# Patient Record
Sex: FEMALE | Race: WHITE | Employment: FULL TIME | URBAN - NONMETROPOLITAN AREA
[De-identification: names, ages, dates, MRNs, and addresses within clinical notes are randomized per-mention and may not be internally consistent; named-entity substitution may affect disease eponyms.]

---

## 2017-08-25 ENCOUNTER — APPOINTMENT (OUTPATIENT)
Dept: GENERAL RADIOLOGY | Age: 32
End: 2017-08-25
Payer: COMMERCIAL

## 2017-08-25 ENCOUNTER — HOSPITAL ENCOUNTER (EMERGENCY)
Age: 32
Discharge: HOME | End: 2017-08-25
Payer: COMMERCIAL

## 2017-08-25 VITALS
TEMPERATURE: 98.4 F | BODY MASS INDEX: 19.29 KG/M2 | OXYGEN SATURATION: 100 % | DIASTOLIC BLOOD PRESSURE: 96 MMHG | SYSTOLIC BLOOD PRESSURE: 142 MMHG | HEIGHT: 66 IN | HEART RATE: 75 BPM | WEIGHT: 120 LBS | RESPIRATION RATE: 14 BRPM

## 2017-08-25 DIAGNOSIS — S69.90XA INJURY, OTHER AND UNSPECIFIED, FINGER: Primary | ICD-10-CM

## 2017-08-25 PROCEDURE — 99283 EMERGENCY DEPT VISIT LOW MDM: CPT

## 2017-08-25 PROCEDURE — 73130 X-RAY EXAM OF HAND: CPT

## 2017-08-25 PROCEDURE — 73120 X-RAY EXAM OF HAND: CPT

## 2017-08-25 ASSESSMENT — ENCOUNTER SYMPTOMS
COLOR CHANGE: 0
BACK PAIN: 0
NAUSEA: 0

## 2017-08-25 ASSESSMENT — PAIN DESCRIPTION - DESCRIPTORS: DESCRIPTORS: PATIENT UNABLE TO DESCRIBE

## 2017-08-25 ASSESSMENT — PAIN DESCRIPTION - LOCATION: LOCATION: HAND

## 2017-08-25 ASSESSMENT — PAIN DESCRIPTION - ORIENTATION: ORIENTATION: LEFT

## 2017-08-25 ASSESSMENT — PAIN DESCRIPTION - PAIN TYPE: TYPE: ACUTE PAIN

## 2017-08-25 ASSESSMENT — PAIN SCALES - GENERAL: PAINLEVEL_OUTOF10: 7

## 2017-08-25 ASSESSMENT — PAIN - FUNCTIONAL ASSESSMENT: PAIN_FUNCTIONAL_ASSESSMENT: 0-10

## 2020-08-10 ENCOUNTER — APPOINTMENT (OUTPATIENT)
Dept: GENERAL RADIOLOGY | Age: 35
End: 2020-08-10
Payer: COMMERCIAL

## 2020-08-10 ENCOUNTER — HOSPITAL ENCOUNTER (EMERGENCY)
Age: 35
Discharge: HOME | End: 2020-08-10
Attending: EMERGENCY MEDICINE
Payer: COMMERCIAL

## 2020-08-10 ENCOUNTER — APPOINTMENT (OUTPATIENT)
Dept: CT IMAGING | Age: 35
End: 2020-08-10
Payer: COMMERCIAL

## 2020-08-10 VITALS
OXYGEN SATURATION: 100 % | DIASTOLIC BLOOD PRESSURE: 89 MMHG | HEIGHT: 66 IN | TEMPERATURE: 98 F | SYSTOLIC BLOOD PRESSURE: 108 MMHG | RESPIRATION RATE: 16 BRPM | HEART RATE: 67 BPM | BODY MASS INDEX: 20.89 KG/M2 | WEIGHT: 130 LBS

## 2020-08-10 LAB
ANION GAP SERPL CALCULATED.3IONS-SCNC: 8 MEQ/L (ref 8–16)
BASOPHILS # BLD: 0.9 %
BASOPHILS ABSOLUTE: 0.1 THOU/MM3 (ref 0–0.1)
BUN BLDV-MCNC: 8 MG/DL (ref 7–22)
CALCIUM SERPL-MCNC: 8.7 MG/DL (ref 8.5–10.5)
CHLORIDE BLD-SCNC: 108 MEQ/L (ref 98–111)
CO2: 24 MEQ/L (ref 23–33)
CREAT SERPL-MCNC: 0.7 MG/DL (ref 0.4–1.2)
EKG ATRIAL RATE: 63 BPM
EKG P AXIS: 67 DEGREES
EKG P-R INTERVAL: 160 MS
EKG Q-T INTERVAL: 396 MS
EKG QRS DURATION: 80 MS
EKG QTC CALCULATION (BAZETT): 405 MS
EKG R AXIS: 55 DEGREES
EKG T AXIS: 39 DEGREES
EKG VENTRICULAR RATE: 63 BPM
EOSINOPHIL # BLD: 0.9 %
EOSINOPHILS ABSOLUTE: 0.1 THOU/MM3 (ref 0–0.4)
ERYTHROCYTE [DISTWIDTH] IN BLOOD BY AUTOMATED COUNT: 12.3 % (ref 11.5–14.5)
ERYTHROCYTE [DISTWIDTH] IN BLOOD BY AUTOMATED COUNT: 40.3 FL (ref 35–45)
GFR SERPL CREATININE-BSD FRML MDRD: > 90 ML/MIN/1.73M2
GLUCOSE BLD-MCNC: 92 MG/DL (ref 70–108)
HCT VFR BLD CALC: 40.9 % (ref 37–47)
HEMOGLOBIN: 13.4 GM/DL (ref 12–16)
IMMATURE GRANS (ABS): 0.02 THOU/MM3 (ref 0–0.07)
IMMATURE GRANULOCYTES: 0.3 %
LYMPHOCYTES # BLD: 17 %
LYMPHOCYTES ABSOLUTE: 1 THOU/MM3 (ref 1–4.8)
MCH RBC QN AUTO: 29.4 PG (ref 26–33)
MCHC RBC AUTO-ENTMCNC: 32.8 GM/DL (ref 32.2–35.5)
MCV RBC AUTO: 89.7 FL (ref 81–99)
MONOCYTES # BLD: 6.8 %
MONOCYTES ABSOLUTE: 0.4 THOU/MM3 (ref 0.4–1.3)
NUCLEATED RED BLOOD CELLS: 0 /100 WBC
OSMOLALITY CALCULATION: 277.4 MOSMOL/KG (ref 275–300)
PLATELET # BLD: 169 THOU/MM3 (ref 130–400)
PMV BLD AUTO: 11.1 FL (ref 9.4–12.4)
POTASSIUM REFLEX MAGNESIUM: 3.8 MEQ/L (ref 3.5–5.2)
PREGNANCY, SERUM: NEGATIVE
RBC # BLD: 4.56 MILL/MM3 (ref 4.2–5.4)
SEG NEUTROPHILS: 74.1 %
SEGMENTED NEUTROPHILS ABSOLUTE COUNT: 4.3 THOU/MM3 (ref 1.8–7.7)
SODIUM BLD-SCNC: 140 MEQ/L (ref 135–145)
TROPONIN T: < 0.01 NG/ML
WBC # BLD: 5.8 THOU/MM3 (ref 4.8–10.8)

## 2020-08-10 PROCEDURE — 99284 EMERGENCY DEPT VISIT MOD MDM: CPT

## 2020-08-10 PROCEDURE — 93010 ELECTROCARDIOGRAM REPORT: CPT | Performed by: INTERNAL MEDICINE

## 2020-08-10 PROCEDURE — 36415 COLL VENOUS BLD VENIPUNCTURE: CPT

## 2020-08-10 PROCEDURE — 84484 ASSAY OF TROPONIN QUANT: CPT

## 2020-08-10 PROCEDURE — 80048 BASIC METABOLIC PNL TOTAL CA: CPT

## 2020-08-10 PROCEDURE — 84703 CHORIONIC GONADOTROPIN ASSAY: CPT

## 2020-08-10 PROCEDURE — 99283 EMERGENCY DEPT VISIT LOW MDM: CPT

## 2020-08-10 PROCEDURE — 93005 ELECTROCARDIOGRAM TRACING: CPT | Performed by: STUDENT IN AN ORGANIZED HEALTH CARE EDUCATION/TRAINING PROGRAM

## 2020-08-10 PROCEDURE — 85025 COMPLETE CBC W/AUTO DIFF WBC: CPT

## 2020-08-10 PROCEDURE — 70450 CT HEAD/BRAIN W/O DYE: CPT

## 2020-08-10 PROCEDURE — 71045 X-RAY EXAM CHEST 1 VIEW: CPT

## 2020-08-10 ASSESSMENT — ENCOUNTER SYMPTOMS
SHORTNESS OF BREATH: 0
EYE REDNESS: 0
ABDOMINAL PAIN: 0
DIARRHEA: 0
ABDOMINAL DISTENTION: 0
CHEST TIGHTNESS: 0
EYE ITCHING: 0
NAUSEA: 0
SINUS PRESSURE: 0
SINUS PAIN: 0
COLOR CHANGE: 0
RHINORRHEA: 0
EYE DISCHARGE: 0
VOMITING: 0

## 2020-08-10 NOTE — ED NOTES
Pt up to bathroom. Pt states that the numbness and tingling has improved. Pt updated on plan of care.      Bradley Myers RN  08/10/20 3021

## 2020-08-10 NOTE — ED NOTES
Pt presents to the ED for left leg numbness that started around 0400 today. Pt states that for the past 2 days she has felt \"weird\". Pt states that she also feels like she is shaking. Pt states she feels unbalanced. Pt denies SOB, chest pain and cough. Pt denies pain.       Maddison Rowe RN  08/10/20 3940

## 2020-08-10 NOTE — ED PROVIDER NOTES
chest pain. Gastrointestinal: Negative for abdominal distention, abdominal pain, diarrhea, nausea and vomiting. Genitourinary: Negative for difficulty urinating, dysuria, frequency, hematuria and urgency. Musculoskeletal: Negative for arthralgias. Skin: Negative for color change and pallor. Neurological: Negative for dizziness, light-headedness and headaches. PAST MEDICAL AND SURGICAL HISTORY   No past medical history on file. Past Surgical History:   Procedure Laterality Date    APPENDECTOMY      MYRINGOTOMY AND TYMPANOSTOMY TUBE PLACEMENT Bilateral     when she was 4years old   [address]   No current facility-administered medications for this encounter. Current Outpatient Medications:     Prenatal Multivit-Min-Fe-FA (PRENATAL VITAMINS PO), Take by mouth, Disp: , Rfl:     UNKNOWN TO PATIENT, Migraine Medication. , Disp: , Rfl:       SOCIAL HISTORY     Social History     Social History Narrative    Not on file     Social History     Tobacco Use    Smoking status: Never Smoker   Substance Use Topics    Alcohol use: No    Drug use: No         ALLERGIES     Allergies   Allergen Reactions    Pcn [Penicillins]          FAMILY HISTORY   No family history on file. PREVIOUS RECORDS   Previous records reviewed    PHYSICAL EXAM     ED Triage Vitals [08/10/20 0912]   BP Temp Temp src Pulse Resp SpO2 Height Weight   (!) 125/104 -- -- 88 17 100 % 5' 6\" (1.676 m) 130 lb (59 kg)     Initial vital signs and nursing assessment reviewed and normal. Pulsoximetry is normal per my interpretation. Additional Vital Signs:  Vitals:    08/10/20 1108   BP: 108/89   Pulse: 67   Resp: 16   Temp: 98 °F (36.7 °C)   SpO2: 100%       Physical Exam  Vitals signs and nursing note reviewed. Constitutional:       General: She is not in acute distress. Appearance: Normal appearance. She is normal weight. She is not ill-appearing. HENT:      Head: Normocephalic and atraumatic. Nose: Nose normal. No congestion or rhinorrhea. Mouth/Throat:      Mouth: Mucous membranes are moist.      Pharynx: Oropharynx is clear. No oropharyngeal exudate or posterior oropharyngeal erythema. Eyes:      Extraocular Movements: Extraocular movements intact. Pupils: Pupils are equal, round, and reactive to light. Neck:      Musculoskeletal: Normal range of motion and neck supple. Cardiovascular:      Rate and Rhythm: Normal rate and regular rhythm. Pulses: Normal pulses. Heart sounds: Normal heart sounds. Pulmonary:      Effort: Pulmonary effort is normal.      Breath sounds: Normal breath sounds. Abdominal:      General: Abdomen is flat. There is no distension. Palpations: Abdomen is soft. Tenderness: There is no abdominal tenderness. Musculoskeletal: Normal range of motion. General: No swelling, tenderness or signs of injury. Right lower leg: No edema. Left lower leg: No edema. Comments: No calf tenderness. Skin:     General: Skin is warm and dry. Neurological:      General: No focal deficit present. Mental Status: She is alert and oriented to person, place, and time. Mental status is at baseline. Sensory: No sensory deficit. Comments: Pulse, motor, and sensory functions present and intact in the bilateral upper and lower extremities. MEDICAL DECISION MAKING   Initial Assessment: 37 y/o female presenting to the ED for evaluation of chest discomfort and extremity paresthesias. Plan: Labs, cardiac workup, CT head. ED RESULTS   Laboratory results:  Labs Reviewed   CBC WITH AUTO DIFFERENTIAL   BASIC METABOLIC PANEL W/ REFLEX TO MG FOR LOW K   HCG, SERUM, QUALITATIVE   TROPONIN   ANION GAP   GLOMERULAR FILTRATION RATE, ESTIMATED   OSMOLALITY       Radiologic studies results:  CT HEAD WO CONTRAST   Final Result   There is no acute edema or hemorrhage. Ectopic cerebellar tonsils are favored. **This report has been created using voice recognition software. It may contain minor errors which are inherent in voice recognition technology. **      Final report electronically signed by Dr. Bernabe Mejia on 8/10/2020 12:17 PM      XR CHEST PORTABLE   Final Result   No acute abnormality identified. **This report has been created using voice recognition software. It may contain minor errors which are inherent in voice recognition technology. **      Final report electronically signed by Dr. Bessie Leon MD on 8/10/2020 10:42 AM          ED Medications administered this visit: Medications - No data to display      ED COURSE        Strict return precautions and follow up instructions were discussed with the patient prior to discharge, with which the patient agrees. MEDICATION CHANGES     Discharge Medication List as of 8/10/2020 12:39 PM            FINAL DISPOSITION   This is a 35 y/o female with no past medical history presenting to the ED for evaluation of chest discomfort and extremity paresthesias. Cardiac workup was negative for ACS, and she is negative for PE by the [facility] criteria. Throughout the emergency department course, she reported gradual improvement of her symptoms and resolution of her symptoms at the time of discharge. Her CT head, while negative for acute intracranial process, demonstrated an incidental finding of possible ectopic cerebellar tonsils. I discussed test results and treatment plan with patient at bedside. I explained that we were unable to find the specific cause of her symptoms in the Emergency Department today, but that she should follow up with her primary care provider within one week regarding her ED visit today. Patient verbalized understanding of treatment plan, return precautions, and all questions were answered at bedside. At this time, they are stable for discharge from the emergency department.        Final diagnoses:   Numbness and tingling

## 2020-10-09 ENCOUNTER — HOSPITAL ENCOUNTER (OUTPATIENT)
Age: 35
Setting detail: SPECIMEN
Discharge: HOME | End: 2020-10-09
Payer: COMMERCIAL

## 2020-10-09 PROCEDURE — U0003 INFECTIOUS AGENT DETECTION BY NUCLEIC ACID (DNA OR RNA); SEVERE ACUTE RESPIRATORY SYNDROME CORONAVIRUS 2 (SARS-COV-2) (CORONAVIRUS DISEASE [COVID-19]), AMPLIFIED PROBE TECHNIQUE, MAKING USE OF HIGH THROUGHPUT TECHNOLOGIES AS DESCRIBED BY CMS-2020-01-R: HCPCS

## 2020-10-12 LAB
PERFORMING LAB: NORMAL
REPORT: NORMAL
SARS-COV-2: NOT DETECTED

## 2021-08-19 ENCOUNTER — HOSPITAL ENCOUNTER (EMERGENCY)
Age: 36
Discharge: HOME | End: 2021-08-19
Attending: EMERGENCY MEDICINE
Payer: COMMERCIAL

## 2021-08-19 VITALS
OXYGEN SATURATION: 96 % | RESPIRATION RATE: 17 BRPM | BODY MASS INDEX: 22.5 KG/M2 | HEART RATE: 92 BPM | WEIGHT: 140 LBS | SYSTOLIC BLOOD PRESSURE: 129 MMHG | DIASTOLIC BLOOD PRESSURE: 88 MMHG | HEIGHT: 66 IN | TEMPERATURE: 98.8 F

## 2021-08-19 DIAGNOSIS — R51.9 NONINTRACTABLE HEADACHE, UNSPECIFIED CHRONICITY PATTERN, UNSPECIFIED HEADACHE TYPE: Primary | ICD-10-CM

## 2021-08-19 DIAGNOSIS — U07.1 COVID-19: ICD-10-CM

## 2021-08-19 LAB
FLU A ANTIGEN: NEGATIVE
FLU B ANTIGEN: NEGATIVE
SARS-COV-2, NAAT: DETECTED

## 2021-08-19 PROCEDURE — 96372 THER/PROPH/DIAG INJ SC/IM: CPT

## 2021-08-19 PROCEDURE — 99283 EMERGENCY DEPT VISIT LOW MDM: CPT

## 2021-08-19 PROCEDURE — 6370000000 HC RX 637 (ALT 250 FOR IP): Performed by: STUDENT IN AN ORGANIZED HEALTH CARE EDUCATION/TRAINING PROGRAM

## 2021-08-19 PROCEDURE — 87635 SARS-COV-2 COVID-19 AMP PRB: CPT

## 2021-08-19 PROCEDURE — 6360000002 HC RX W HCPCS: Performed by: STUDENT IN AN ORGANIZED HEALTH CARE EDUCATION/TRAINING PROGRAM

## 2021-08-19 PROCEDURE — 87804 INFLUENZA ASSAY W/OPTIC: CPT

## 2021-08-19 RX ORDER — KETOROLAC TROMETHAMINE 30 MG/ML
30 INJECTION, SOLUTION INTRAMUSCULAR; INTRAVENOUS ONCE
Status: COMPLETED | OUTPATIENT
Start: 2021-08-19 | End: 2021-08-19

## 2021-08-19 RX ORDER — METOCLOPRAMIDE 10 MG/1
20 TABLET ORAL ONCE
Status: COMPLETED | OUTPATIENT
Start: 2021-08-19 | End: 2021-08-19

## 2021-08-19 RX ORDER — DIPHENHYDRAMINE HCL 25 MG
50 TABLET ORAL ONCE
Status: COMPLETED | OUTPATIENT
Start: 2021-08-19 | End: 2021-08-19

## 2021-08-19 RX ORDER — ACETAMINOPHEN 500 MG
1000 TABLET ORAL ONCE
Status: COMPLETED | OUTPATIENT
Start: 2021-08-19 | End: 2021-08-19

## 2021-08-19 RX ADMIN — DIPHENHYDRAMINE HCL 50 MG: 25 TABLET ORAL at 14:11

## 2021-08-19 RX ADMIN — METOCLOPRAMIDE 20 MG: 10 TABLET ORAL at 14:11

## 2021-08-19 RX ADMIN — ACETAMINOPHEN 1000 MG: 500 TABLET ORAL at 14:12

## 2021-08-19 RX ADMIN — KETOROLAC TROMETHAMINE 30 MG: 30 INJECTION, SOLUTION INTRAMUSCULAR; INTRAVENOUS at 14:12

## 2021-08-19 ASSESSMENT — PAIN DESCRIPTION - LOCATION: LOCATION: HEAD

## 2021-08-19 ASSESSMENT — PAIN DESCRIPTION - PAIN TYPE: TYPE: ACUTE PAIN

## 2021-08-19 ASSESSMENT — PAIN SCALES - GENERAL
PAINLEVEL_OUTOF10: 7
PAINLEVEL_OUTOF10: 7

## 2021-08-19 NOTE — ED TRIAGE NOTES
Presents to ER with complaints of headache and cough that began yesterday. Pt states she has been around 3 people who have tested positive for COVID. Upon arrival pt has eye mask on and holding bucket against chest. Respirations unlabored.

## 2021-08-19 NOTE — ED PROVIDER NOTES
[address] [address]          Pt Name: Bronson Isabel  MRN: [id number]  [place] 1985  Date of evaluation: 8/19/2021  Treating Resident Physician: Kings Heredia MD  Supervising Physician: Dr. Luke [place]       Chief Complaint   Patient presents with    Covid Testing    Headache     History obtained from the patient     HISTORY OF PRESENT ILLNESS    HPI  Bronson Isabel is a 30 y.o. female who presents to the emergency department for evaluation of, frontal, associated nausea, no vomiting, photophobia, phonophobia. Patient has positive myalgias arthralgias generalized fatigue decreased appetite. Positive Covid contact unvaccinated. Denies any neck pain, neck stiffness. The patient has no other acute complaints at this time. Headache. Patient states that headache is 8 out of 10    REVIEW OF SYSTEMS   Review of Systems   Constitutional: Positive for chills, diaphoresis and fatigue. Negative for fever. HENT: Positive for congestion. Eyes: Positive for photophobia. Negative for visual disturbance. Respiratory: Negative for cough, choking and shortness of breath. Gastrointestinal: Positive for diarrhea and nausea. Negative for abdominal distention. Genitourinary: Negative. Musculoskeletal: Positive for arthralgias and myalgias. Negative for neck pain and neck stiffness. Skin: Negative for rash. Neurological: Positive for headaches. Negative for syncope, weakness and light-headedness. PAST MEDICAL AND SURGICAL HISTORY   No past medical history on file. Past Surgical History:   Procedure Laterality Date    APPENDECTOMY      MYRINGOTOMY AND TYMPANOSTOMY TUBE PLACEMENT Bilateral     when she was 4years old   [address]   No current facility-administered medications for this encounter.     Current Outpatient Medications:     Prenatal Multivit-Min-Fe-FA (PRENATAL VITAMINS PO), Take by mouth, Disp: , Rfl:     UNKNOWN TO PATIENT, Migraine Medication. , Disp: , Rfl:       SOCIAL HISTORY     Social History     Social History Narrative    Not on file     Social History     Tobacco Use    Smoking status: Never Smoker   Substance Use Topics    Alcohol use: No    Drug use: No         ALLERGIES     Allergies   Allergen Reactions    Pcn [Penicillins]          FAMILY HISTORY   No family history on file. PREVIOUS RECORDS   Previous records reviewed: Medical, past surgical, medications, allergies        PHYSICAL EXAM     ED Triage Vitals   BP Temp Temp Source Pulse Resp SpO2 Height Weight   08/19/21 1228 08/19/21 1227 08/19/21 1227 08/19/21 1227 08/19/21 1227 08/19/21 1227 08/19/21 1227 08/19/21 1227   129/88 98.8 °F (37.1 °C) Oral 92 17 96 % 5' 6\" (1.676 m) 140 lb (63.5 kg)     Initial vital signs and nursing assessment reviewed and normal.  Pulse oximetry is normal  Additional Vital Signs:  Vitals:    08/19/21 1228   BP: 129/88   Pulse:    Resp:    Temp:    SpO2:        Physical Exam  Constitutional:       Appearance: Normal appearance. She is ill-appearing. HENT:      Head: Normocephalic. Right Ear: External ear normal.      Left Ear: External ear normal.      Mouth/Throat:      Mouth: Mucous membranes are moist.      Pharynx: Oropharynx is clear. Eyes:      Conjunctiva/sclera: Conjunctivae normal.      Pupils: Pupils are equal, round, and reactive to light. Cardiovascular:      Rate and Rhythm: Normal rate and regular rhythm. Pulses: Normal pulses. Heart sounds: No murmur heard. Pulmonary:      Effort: Pulmonary effort is normal.      Breath sounds: Normal breath sounds. Abdominal:      General: Abdomen is flat. There is no distension. Palpations: Abdomen is soft. Tenderness: There is no abdominal tenderness. Musculoskeletal:         General: Normal range of motion. Cervical back: Neck supple. Skin:     General: Skin is warm and dry. Capillary Refill: Capillary refill takes less than 2 seconds. Neurological:      Mental Status: She is alert. MEDICAL DECISION MAKING   Initial Assessment:   67-year-old female, presenting with Covid contact, malaise, headache. Physical exam significant for ill-appearing female with obvious photophobia. Plan:    Covid positive.  Given Toradol Reglan Benadryl   Discharged home with strict return precautions, follow-up, Pulsoxymeter. ED RESULTS   Laboratory results:  Labs Reviewed   COVID-19, RAPID - Abnormal; Notable for the following components:       Result Value    SARS-CoV-2, NAAT DETECTED (*)     All other components within normal limits   RAPID INFLUENZA A/B ANTIGENS       Radiologic studies results:  No orders to display       ED Medications administered this visit:   Medications   ketorolac (TORADOL) injection 30 mg (30 mg Intramuscular Given 8/19/21 1412)   acetaminophen (TYLENOL) tablet 1,000 mg (1,000 mg Oral Given 8/19/21 1412)   diphenhydrAMINE (BENADRYL) tablet 50 mg (50 mg Oral Given 8/19/21 1411)   metoclopramide (REGLAN) tablet 20 mg (20 mg Oral Given 8/19/21 1411)         ED COURSE      Strict return precautions and follow up instructions were discussed with the patient prior to discharge, with which the patient agrees. MEDICATION CHANGES     Discharge Medication List as of 8/19/2021  2:10 PM            FINAL DISPOSITION     Final diagnoses:   Nonintractable headache, unspecified chronicity pattern, unspecified headache type   COVID-19     Condition: Good  Disposition: Discharged home    This transcription was electronically signed. Parts of this transcriptions may have been dictated by use of voice recognition software and electronically transcribed, and parts may have been transcribed with the assistance of an ED scribe. The transcription may contain errors not detected in proofreading.   Please refer to my supervising physician's documentation if my documentation differs.     Electronically Signed: Heather Verdugo MD, 08/19/21, 2:38 Erasmo Zapien MD  Resident  08/20/21 1000

## 2021-08-20 ENCOUNTER — CARE COORDINATION (OUTPATIENT)
Dept: CARE COORDINATION | Age: 36
End: 2021-08-20

## 2021-08-20 ASSESSMENT — ENCOUNTER SYMPTOMS
DIARRHEA: 1
PHOTOPHOBIA: 1
ABDOMINAL DISTENTION: 0
CHOKING: 0
NAUSEA: 1
COUGH: 0
SHORTNESS OF BREATH: 0

## 2021-08-20 NOTE — CARE COORDINATION
Attempted to reach patient for covid-19 f/u s/p recent ED visit for c/o headache, fatigue and COVID-19 testing s/p recent exposure. COVID-19 testing was completed and results were positive. Patient was not available at the time of my call and no voicemail option available. Will continue to attempt to f/u with patient in the future.

## 2021-08-23 NOTE — CARE COORDINATION
Final attempt to reach patient for covid-19 f/u s/p recent ED visit for c/o headache, fatigue and COVID-19 testing s/p recent exposure. COVID-19 testing was completed and results were positive. Patient was not available at the time of my call and no voicemail option available. No further f/u planned and Episode of Care resolved.

## 2021-12-20 ENCOUNTER — HOSPITAL ENCOUNTER (EMERGENCY)
Age: 36
Discharge: HOME | End: 2021-12-21
Attending: FAMILY MEDICINE
Payer: COMMERCIAL

## 2021-12-20 VITALS
BODY MASS INDEX: 22.5 KG/M2 | HEIGHT: 66 IN | DIASTOLIC BLOOD PRESSURE: 98 MMHG | RESPIRATION RATE: 18 BRPM | TEMPERATURE: 98.1 F | SYSTOLIC BLOOD PRESSURE: 144 MMHG | WEIGHT: 140 LBS | OXYGEN SATURATION: 98 % | HEART RATE: 77 BPM

## 2021-12-20 DIAGNOSIS — M54.12 CERVICAL RADICULOPATHY: Primary | ICD-10-CM

## 2021-12-20 DIAGNOSIS — S16.1XXA ACUTE STRAIN OF NECK MUSCLE, INITIAL ENCOUNTER: ICD-10-CM

## 2021-12-20 PROCEDURE — 96372 THER/PROPH/DIAG INJ SC/IM: CPT

## 2021-12-20 PROCEDURE — 20552 NJX 1/MLT TRIGGER POINT 1/2: CPT

## 2021-12-20 PROCEDURE — 99281 EMR DPT VST MAYX REQ PHY/QHP: CPT

## 2021-12-20 ASSESSMENT — PAIN SCALES - GENERAL: PAINLEVEL_OUTOF10: 6

## 2021-12-20 NOTE — Clinical Note
Meka Mabry was seen and treated in our emergency department on 12/20/2021. She may return to work on 12/23/2021. Excused from work on 12/22/21     If you have any questions or concerns, please don't hesitate to call.       Marlana Dakin, MD

## 2021-12-21 PROCEDURE — 6360000002 HC RX W HCPCS: Performed by: FAMILY MEDICINE

## 2021-12-21 PROCEDURE — 2500000003 HC RX 250 WO HCPCS: Performed by: FAMILY MEDICINE

## 2021-12-21 RX ORDER — KETOROLAC TROMETHAMINE 30 MG/ML
30 INJECTION, SOLUTION INTRAMUSCULAR; INTRAVENOUS ONCE
Status: COMPLETED | OUTPATIENT
Start: 2021-12-21 | End: 2021-12-21

## 2021-12-21 RX ORDER — METHYLPREDNISOLONE ACETATE 80 MG/ML
40 INJECTION, SUSPENSION INTRA-ARTICULAR; INTRALESIONAL; INTRAMUSCULAR; SOFT TISSUE ONCE
Status: COMPLETED | OUTPATIENT
Start: 2021-12-21 | End: 2021-12-21

## 2021-12-21 RX ORDER — CYCLOBENZAPRINE HCL 10 MG
10 TABLET ORAL 3 TIMES DAILY PRN
Qty: 21 TABLET | Refills: 0 | Status: SHIPPED | OUTPATIENT
Start: 2021-12-21 | End: 2021-12-31

## 2021-12-21 RX ORDER — LIDOCAINE HYDROCHLORIDE 10 MG/ML
10 INJECTION, SOLUTION EPIDURAL; INFILTRATION; INTRACAUDAL; PERINEURAL ONCE
Status: COMPLETED | OUTPATIENT
Start: 2021-12-21 | End: 2021-12-21

## 2021-12-21 RX ORDER — METHYLPREDNISOLONE SODIUM SUCCINATE 125 MG/2ML
80 INJECTION, POWDER, LYOPHILIZED, FOR SOLUTION INTRAMUSCULAR; INTRAVENOUS ONCE
Status: COMPLETED | OUTPATIENT
Start: 2021-12-21 | End: 2021-12-21

## 2021-12-21 RX ADMIN — LIDOCAINE HYDROCHLORIDE 10 ML: 10 INJECTION, SOLUTION EPIDURAL; INFILTRATION; INTRACAUDAL; PERINEURAL at 01:22

## 2021-12-21 RX ADMIN — METHYLPREDNISOLONE SODIUM SUCCINATE 80 MG: 125 INJECTION, POWDER, FOR SOLUTION INTRAMUSCULAR; INTRAVENOUS at 00:10

## 2021-12-21 RX ADMIN — KETOROLAC TROMETHAMINE 30 MG: 30 INJECTION, SOLUTION INTRAMUSCULAR; INTRAVENOUS at 00:11

## 2021-12-21 RX ADMIN — METHYLPREDNISOLONE ACETATE 40 MG: 80 INJECTION, SUSPENSION INTRA-ARTICULAR; INTRALESIONAL; INTRAMUSCULAR; SOFT TISSUE at 01:21

## 2021-12-21 NOTE — ED NOTES
Pt to er. Pt c/o neck, shoulder and arm pain rt side. States thinks she pinched a nerve. States went to chiropractor today and had an adjustment and it just made it worse and has tingling down arm now.       Jose Luis Johnston RN  12/20/21 8046

## 2021-12-21 NOTE — ED PROVIDER NOTES
[address] COMPLAINT   Chief Complaint   Patient presents with    Shoulder Pain    Neck Pain        HPI   Jeanna Meyer is a 29 y.o. female who presents with neck pain without antecedent injury. The onset was 2 days ago. The reason why the patient has this issue was two days ago when the back of her neck suddenly became sore and painful. She went to obtain a chiropractic adjustment, which worsened her pain to the point that numbness and pain radiates from the back of the right side of her neck . The patient complains of neck muscle pain but denies any weakness. The quality is throbbing and aching pain. The patient has no associated weakness or  strength deficits. REVIEW OF SYSTEMS   Neck: posterior neck pain  Neurologic: No head injury; paresthesia to right arm to thumb emanating from neck  Cardiac: No Chest Pain, No syncope   GI:No abdominal pain or vomiting   MSK: +neck pain, no weakness in extremities  See history of present illness   All other review of systems were reviewed and are otherwise negative. PAST MEDICAL & SURGICAL HISTORY   History reviewed. No pertinent past medical history. Past Surgical History:   Procedure Laterality Date    APPENDECTOMY      MYRINGOTOMY AND TYMPANOSTOMY TUBE PLACEMENT Bilateral     when she was 4years old    TONSILLECTOMY          CURRENT MEDICATIONS   Current Outpatient Rx   Medication Sig Dispense Refill    cyclobenzaprine (FLEXERIL) 10 MG tablet Take 1 tablet by mouth 3 times daily as needed for Muscle spasms 21 tablet 0    Prenatal Multivit-Min-Fe-FA (PRENATAL VITAMINS PO) Take by mouth      UNKNOWN TO PATIENT Migraine Medication.           ALLERGIES   Allergies   Allergen Reactions    Pcn [Penicillins]         SOCIAL & FAMILY HISTORY   Social History     Socioeconomic History    Marital status:      Spouse name: None    Number of children: None    Years of education: None    Highest education level: None Occupational History    None   Tobacco Use    Smoking status: Never Smoker    Smokeless tobacco: None   Substance and Sexual Activity    Alcohol use: No    Drug use: No    Sexual activity: Yes     Partners: Male   Other Topics Concern    None   Social History Narrative    None     Social Determinants of Health     Financial Resource Strain:     Difficulty of Paying Living Expenses: Not on file   Food Insecurity:     Worried About Running Out of Food in the Last Year: Not on file    Santa of Food in the Last Year: Not on file   Transportation Needs:     Lack of Transportation (Medical): Not on file    Lack of Transportation (Non-Medical): Not on file   Physical Activity:     Days of Exercise per Week: Not on file    Minutes of Exercise per Session: Not on file   Stress:     Feeling of Stress : Not on file   Social Connections:     Frequency of Communication with Friends and Family: Not on file    Frequency of Social Gatherings with Friends and Family: Not on file    Attends Restorationism Services: Not on file    Active Member of [address] [place] [address] or Organizations: Not on file    Attends Club or Organization Meetings: Not on file    Marital Status: Not on file   Intimate Partner Violence:     Fear of Current or Ex-Partner: Not on file    Emotionally Abused: Not on file    Physically Abused: Not on file    Sexually Abused: Not on file   Housing Stability:     Unable to Pay for Housing in the Last Year: Not on file    Number of [place] in the Last Year: Not on file    Unstable Housing in the Last Year: Not on file      History reviewed. No pertinent family history.      PHYSICAL EXAM   VITAL SIGNS: BP (!) 144/98   Pulse 77   Temp 98.1 °F (36.7 °C) (Oral)   Resp 18   Ht 5' 6\" (1.676 m)   Wt 140 lb (63.5 kg)   SpO2 98%   BMI 22.60 kg/m²    Constitutional: Well developed, well nourished, no acute distress   Eyes: Pupils equally round and react to light, sclera nonicteric   HENT: Atraumatic, no trismus   Neck: supple, no JVD, ropey right posterior longitudinal muscle tenderness without midline tenderness; neck is supple otherwise. Respiratory: Normal respirations  Musculoskeletal: No edema, tenderness to palpation of medial aspect of right scapula muscle   Integument: Well hydrated, no petechiae   Neurologic: Alert & oriented, no slurred speech, 5/5  strength noted in both arms; normal right thumb adduction and oppositional strength   Psych: Pleasant affect       RADIOLOGY   No orders to display      Procedure (PERFORMED 12/21/21 0030)  TRIGGER POINT INJECTION OF RIGHT POSTERIOR NECK  USING 1% LIDOCAINE 4ML MIXED WITH 1 ML OF DEPOMEDROL 40MG  PATIENT GAVE VERBAL CONSENT  AREA PREPPED WITH ALCOHOL  27G NEEDLE USED TO INJECT SUPERFICIALLY TO RIGHT POSTERIOR NECK MUSCLE ALONG LONGITUDINAL LINE PROXIMAL FROM BASE OF OCCIPUT TO MID POSTERIOR  NECK. PT TOLERATED PROCEDURE VERY WELL  BANDAID PLACED TO INJECTION SITE. Labs Reviewed - No data to display     ED COURSE & MEDICAL DECISION MAKING   Pertinent Labs & Imaging studies reviewed and interpreted. (See chart for details)   Vitals:    12/20/21 2154   BP: (!) 144/98   Pulse: 77   Resp: 18   Temp: 98.1 °F (36.7 °C)   SpO2: 98%        Differential Diagnosis: neck strain, cervical sprain and injury    FINAL IMPRESSION   1. Cervical radiculopathy    2. Acute strain of neck muscle, initial encounter         PLAN   MDM - pt received IM solumedrol and toradol. Pt also received trigger point injection to the affected posterior neck muscle area. Pt has no focal weakness of her right arm, thus her paresthesia was likely caused by the chiropractic adjustment. Pt certainly could benefit from external ice or hot packs for amelioration of her symptoms. Via shared decision making, pt agreed with plan for discharge with short course Rx of flexeril for additional relief.    Electronically signed by: Avtar Flores MD, 12/21/2021 1:06 AM        Temo Thomson MD  12/21/21 0106

## 2022-08-23 ENCOUNTER — NURSE ONLY (OUTPATIENT)
Dept: LAB | Age: 37
End: 2022-08-23

## 2022-08-30 LAB — CYTOLOGY THIN PREP PAP: NORMAL

## 2025-08-17 ENCOUNTER — HOSPITAL ENCOUNTER (EMERGENCY)
Age: 40
Discharge: HOME | End: 2025-08-17
Attending: EMERGENCY MEDICINE
Payer: COMMERCIAL

## 2025-08-17 VITALS
TEMPERATURE: 98.1 F | BODY MASS INDEX: 23.3 KG/M2 | DIASTOLIC BLOOD PRESSURE: 100 MMHG | WEIGHT: 145 LBS | HEART RATE: 74 BPM | OXYGEN SATURATION: 100 % | RESPIRATION RATE: 16 BRPM | SYSTOLIC BLOOD PRESSURE: 132 MMHG | HEIGHT: 66 IN

## 2025-08-17 DIAGNOSIS — S61.216A LACERATION OF RIGHT LITTLE FINGER WITHOUT FOREIGN BODY WITHOUT DAMAGE TO NAIL, INITIAL ENCOUNTER: Primary | ICD-10-CM

## 2025-08-17 PROCEDURE — 90471 IMMUNIZATION ADMIN: CPT

## 2025-08-17 PROCEDURE — 6360000002 HC RX W HCPCS

## 2025-08-17 PROCEDURE — 90715 TDAP VACCINE 7 YRS/> IM: CPT

## 2025-08-17 PROCEDURE — 99284 EMERGENCY DEPT VISIT MOD MDM: CPT

## 2025-08-17 RX ORDER — LIDOCAINE HYDROCHLORIDE AND EPINEPHRINE 10; 10 MG/ML; UG/ML
20 INJECTION, SOLUTION INFILTRATION; PERINEURAL ONCE
Status: COMPLETED | OUTPATIENT
Start: 2025-08-17 | End: 2025-08-17

## 2025-08-17 RX ADMIN — LIDOCAINE HYDROCHLORIDE AND EPINEPHRINE 20 ML: 10; 10 INJECTION, SOLUTION INFILTRATION; PERINEURAL at 16:37

## 2025-08-17 RX ADMIN — TETANUS TOXOID, REDUCED DIPHTHERIA TOXOID AND ACELLULAR PERTUSSIS VACCINE, ADSORBED 0.5 ML: 5; 2.5; 8; 8; 2.5 SUSPENSION INTRAMUSCULAR at 16:36

## 2025-08-17 ASSESSMENT — PAIN - FUNCTIONAL ASSESSMENT: PAIN_FUNCTIONAL_ASSESSMENT: 0-10
